# Patient Record
Sex: MALE | Race: WHITE | Employment: STUDENT | URBAN - METROPOLITAN AREA
[De-identification: names, ages, dates, MRNs, and addresses within clinical notes are randomized per-mention and may not be internally consistent; named-entity substitution may affect disease eponyms.]

---
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
## 2024-05-26 ENCOUNTER — V-VISIT (OUTPATIENT)
Dept: URGENT CARE | Age: 19
End: 2024-05-26

## 2024-05-26 VITALS
OXYGEN SATURATION: 100 % | WEIGHT: 152.56 LBS | SYSTOLIC BLOOD PRESSURE: 103 MMHG | RESPIRATION RATE: 20 BRPM | HEART RATE: 97 BPM | BODY MASS INDEX: 20.66 KG/M2 | DIASTOLIC BLOOD PRESSURE: 74 MMHG | HEIGHT: 72 IN | TEMPERATURE: 96.8 F

## 2024-05-26 DIAGNOSIS — B27.90 INFECTIOUS MONONUCLEOSIS WITHOUT COMPLICATION, INFECTIOUS MONONUCLEOSIS DUE TO UNSPECIFIED ORGANISM: Primary | ICD-10-CM

## 2024-05-26 LAB
FLUAV AG UPPER RESP QL IA.RAPID: NEGATIVE
FLUBV AG UPPER RESP QL IA.RAPID: NEGATIVE
HETEROPH AB SER QL: POSITIVE
INTERNAL PROCEDURAL CONTROLS ACCEPTABLE: YES
S PYO AG THROAT QL IA.RAPID: NEGATIVE
SARS-COV+SARS-COV-2 AG RESP QL IA.RAPID: NOT DETECTED
TEST LOT EXPIRATION DATE: NORMAL
TEST LOT EXPIRATION DATE: NORMAL
TEST LOT NUMBER: NORMAL
TEST LOT NUMBER: NORMAL

## 2024-05-26 PROCEDURE — 86308 HETEROPHILE ANTIBODY SCREEN: CPT | Performed by: NURSE PRACTITIONER

## 2024-05-26 PROCEDURE — 87428 SARSCOV & INF VIR A&B AG IA: CPT | Performed by: NURSE PRACTITIONER

## 2024-05-26 PROCEDURE — 87880 STREP A ASSAY W/OPTIC: CPT | Performed by: NURSE PRACTITIONER

## 2024-05-26 PROCEDURE — 99203 OFFICE O/P NEW LOW 30 MIN: CPT | Performed by: NURSE PRACTITIONER

## 2024-05-26 ASSESSMENT — ENCOUNTER SYMPTOMS
COUGH: 0
SORE THROAT: 1
DIAPHORESIS: 0
APPETITE CHANGE: 0
FEVER: 1
SINUS PAIN: 0
RHINORRHEA: 0
SHORTNESS OF BREATH: 0
SINUS PRESSURE: 0
VOICE CHANGE: 0
FATIGUE: 1
CHILLS: 0
UNEXPECTED WEIGHT CHANGE: 0
TROUBLE SWALLOWING: 0
ACTIVITY CHANGE: 0
WHEEZING: 0

## 2024-05-26 ASSESSMENT — PAIN SCALES - GENERAL: PAINLEVEL: 7

## 2024-05-28 ENCOUNTER — NURSE TRIAGE (OUTPATIENT)
Dept: TELEHEALTH | Age: 19
End: 2024-05-28

## 2024-05-28 ENCOUNTER — TELEPHONE (OUTPATIENT)
Dept: FAMILY MEDICINE CLINIC | Facility: CLINIC | Age: 19
End: 2024-05-28

## 2024-05-28 ENCOUNTER — HOSPITAL ENCOUNTER (OUTPATIENT)
Age: 19
Discharge: HOME | End: 2024-05-28

## 2024-05-28 VITALS
WEIGHT: 175 LBS | DIASTOLIC BLOOD PRESSURE: 77 MMHG | HEIGHT: 72 IN | RESPIRATION RATE: 16 BRPM | OXYGEN SATURATION: 98 % | TEMPERATURE: 97 F | BODY MASS INDEX: 23.7 KG/M2 | SYSTOLIC BLOOD PRESSURE: 122 MMHG | HEART RATE: 83 BPM

## 2024-05-28 DIAGNOSIS — B27.90 INFECTIOUS MONONUCLEOSIS WITHOUT COMPLICATION, INFECTIOUS MONONUCLEOSIS DUE TO UNSPECIFIED ORGANISM: Primary | ICD-10-CM

## 2024-05-28 LAB — S PYO AG THROAT QL IA.RAPID: NEGATIVE

## 2024-05-28 PROCEDURE — 99213 OFFICE O/P EST LOW 20 MIN: CPT

## 2024-05-28 PROCEDURE — 87651 STREP A DNA AMP PROBE: CPT | Performed by: NURSE PRACTITIONER

## 2024-05-28 PROCEDURE — 99214 OFFICE O/P EST MOD 30 MIN: CPT

## 2024-05-28 RX ORDER — DEXAMETHASONE 4 MG/1
12 TABLET ORAL ONCE
Status: COMPLETED | OUTPATIENT
Start: 2024-05-28 | End: 2024-05-28

## 2024-05-28 RX ORDER — ONDANSETRON 4 MG/1
4 TABLET, ORALLY DISINTEGRATING ORAL EVERY 8 HOURS PRN
Qty: 10 TABLET | Refills: 0 | Status: SHIPPED | OUTPATIENT
Start: 2024-05-28 | End: 2024-06-04

## 2024-05-28 NOTE — ED INITIAL ASSESSMENT (HPI)
Pt diagnosed with mono on Sunday ; tonsils more inflammed ; strep negative on Sunday    Tmax 100f

## 2024-05-28 NOTE — TELEPHONE ENCOUNTER
Patients mother calling for an appointment today with Dr. Lois Rodriguez (out of office). Patient was seen at a [facility] this weekend and diagnosed with Mono. Other symptoms have come up that were concerning.   Mother is taking patient to [facility]

## 2024-05-28 NOTE — DISCHARGE INSTRUCTIONS
The decadron will help with pain and swelling for the net 3-4 days.  Continue Advil.  No contact sports or heavy lifting until released by primary.

## 2024-05-28 NOTE — ED PROVIDER NOTES
Patient Seen in: [facility]      History     Chief Complaint   Patient presents with    Sore Throat     Stated Complaint: Tosil pain,    Subjective:   HPI  18-year-old male who tested positive for mono over the weekend who his girlfriend currently has strep and mono presents with mother for worsening tonsillar swelling and pain.  He has no fever.  He has some nausea without abdominal pain.  He has been taking Advil for pain.    Objective:   Past Medical History:    Allergic rhinitis    Closed nondisplaced fracture of proximal phalanx of left thumb, initial encounter    History of varicella as a child    New daily persistent headache    Currently no HA    Pectus excavatum    Striae    back              Past Surgical History:   Procedure Laterality Date    Anesth,surgery of shoulder Right 11/2022    labrum tear                Social History     Socioeconomic History    Marital status: Single   Tobacco Use    Smoking status: Never    Smokeless tobacco: Never   Vaping Use    Vaping status: Never Used   Substance and Sexual Activity    Alcohol use: No     Alcohol/week: 0.0 standard drinks of alcohol    Drug use: No    Sexual activity: Never   Other Topics Concern     Service No    Blood Transfusions No    Caffeine Concern Yes     Comment: 2 Cans of soda daily    Occupational Exposure No    Hobby Hazards No    Sleep Concern No    Stress Concern No    Weight Concern No    Special Diet No    Back Care No    Exercise Yes     Comment: 2 x weekly    Bike Helmet No    Seat Belt Yes    Self-Exams No              Review of Systems   All other systems reviewed and are negative.      Positive for stated complaint: Tosil pain,  Other systems are as noted in HPI.  Constitutional and vital signs reviewed.      All other systems reviewed and negative except as noted above.    Physical Exam     ED Triage Vitals [05/28/24 1620]   /77   Pulse 83   Resp 16   Temp 97.4 °F (36.3 °C)   Temp src Temporal   SpO2  98 %   O2 Device None (Room air)       Current Vitals:   Vital Signs  BP: 122/77  Pulse: 83  Resp: 16  Temp: 97.4 °F (36.3 °C)  Temp src: Temporal    Oxygen Therapy  SpO2: 98 %  O2 Device: None (Room air)            Physical Exam  Vitals and nursing note reviewed.   Constitutional:       General: He is not in acute distress.     Appearance: He is well-developed. He is not ill-appearing or toxic-appearing.   HENT:      Right Ear: Tympanic membrane and ear canal normal.      Left Ear: Tympanic membrane and ear canal normal.      Nose: No congestion or rhinorrhea.      Mouth/Throat:      Pharynx: Uvula midline. Posterior oropharyngeal erythema present. No pharyngeal swelling, oropharyngeal exudate or uvula swelling.      Tonsils: Tonsillar exudate present. No tonsillar abscesses. 1+ on the right. 1+ on the left.      Comments: No trismus  Cardiovascular:      Rate and Rhythm: Normal rate and regular rhythm.      Heart sounds: Normal heart sounds.   Pulmonary:      Effort: Pulmonary effort is normal.      Breath sounds: Normal breath sounds.   Lymphadenopathy:      Cervical: Cervical adenopathy present.   Skin:     General: Skin is warm and dry.   Neurological:      Mental Status: He is alert and oriented to person, place, and time.               ED Course     Labs Reviewed   RAPID STREP A - Normal                      MDM     Medical Decision Making  18-year-old male who tested positive for mono over the weekend who his girlfriend currently has strep and mono presents with mother for worsening tonsillar swelling and pain.  He has no fever.  He has some nausea without abdominal pain.  He has been taking Advil for pain.    Clinical impression: Mono    Differential diagnosis: Mono, peritonsillar abscess, strep    Strep negative.  There is no evidence of a peritonsillar abscess.  Patient with known mono.  He will be given Decadron since he is only taking Advil and is complaining of pain and swelling.  Patient and mother  instructed on Advil, close follow-up with primary care doctor, and no contact sports or exertional activity.    Problems Addressed:  Infectious mononucleosis without complication, infectious mononucleosis due to unspecified organism: acute illness or injury    Amount and/or Complexity of Data Reviewed  Independent Historian: parent     Details: Mother        Disposition and Plan     Clinical Impression:  1. Infectious mononucleosis without complication, infectious mononucleosis due to unspecified organism         Disposition:  Discharge  5/28/2024  4:39 pm    Follow-up:  No follow-up provider specified.        Medications Prescribed:  Discharge Medication List as of 5/28/2024  4:42 PM

## 2024-05-31 ENCOUNTER — TELEPHONE (OUTPATIENT)
Dept: FAMILY MEDICINE CLINIC | Facility: CLINIC | Age: 19
End: 2024-05-31

## 2024-05-31 NOTE — TELEPHONE ENCOUNTER
Detailed VM left for patient. I gave him all the below recommendations, reviewed supportive care, and Er precautions. Verbalized understanding.   
I spoke w/ Ron. He went to [facility] and dx w/ mono. He was given Decadron 12mg PO while he was there. He says it did help and he was feeling better for a day or so but now his symptoms have returned: sore throat, body aches, fever. Would a course of steroids be appropriate? Please advise, thanks.    Supportive care for mono reviewed.   
MYCHAL left for Ron to return call.   
Patients mother calling, patient was seen at  for mono and prescribed prednisone, which seemed to help. Mother is asking if they can get another refill   Please advise.   
Routine use of corticosteroids for infectious mono are not recommended.  However, recommend supportive care with analgesics such as NSAIDs ibuprofen or naproxen, patient can also try acetaminophen.  Patient should stay well-hydrated, dehydration can contribute to pain.  Patient should also try to follow a healthy diet.  If patient has any difficulty with breathing he should go to the emergency department.    If needed, okay for note for off work or off school for a week.  
10-Santy-2019 07:54

## 2024-07-11 ENCOUNTER — OFFICE VISIT (OUTPATIENT)
Dept: FAMILY MEDICINE CLINIC | Facility: CLINIC | Age: 19
End: 2024-07-11
Payer: COMMERCIAL

## 2024-07-11 VITALS
HEIGHT: 72.48 IN | DIASTOLIC BLOOD PRESSURE: 60 MMHG | HEART RATE: 70 BPM | WEIGHT: 171 LBS | SYSTOLIC BLOOD PRESSURE: 102 MMHG | RESPIRATION RATE: 20 BRPM | OXYGEN SATURATION: 97 % | BODY MASS INDEX: 22.91 KG/M2 | TEMPERATURE: 98 F

## 2024-07-11 DIAGNOSIS — B27.90 INFECTIOUS MONONUCLEOSIS WITHOUT COMPLICATION, INFECTIOUS MONONUCLEOSIS DUE TO UNSPECIFIED ORGANISM: Primary | ICD-10-CM

## 2024-07-11 PROCEDURE — 99213 OFFICE O/P EST LOW 20 MIN: CPT | Performed by: FAMILY MEDICINE

## 2024-07-11 NOTE — PROGRESS NOTES
Ron Watts is a 18 year old male.     Chief Complaint   Patient presents with    Other     Checking to see if spleen is enlarged mono positive end of may          HPI:     Patient presents with his mother who is pleasant and supportive.      Patient diagnosed with infectious mono 5/26/2024.  Patient inquiring as to whether or not he may return to physical activities and sports.  Feeling much better overall.            Wt Readings from Last 6 Encounters:   07/11/24 171 lb (77.6 kg) (72%, Z= 0.57)*   05/28/24 175 lb (79.4 kg) (76%, Z= 0.72)*   08/11/23 176 lb (79.8 kg) (80%, Z= 0.84)*   05/23/23 160 lb (72.6 kg) (63%, Z= 0.34)*   05/18/23 160 lb (72.6 kg) (63%, Z= 0.34)*   07/07/22 158 lb (71.7 kg) (66%, Z= 0.41)*     * Growth percentiles are based on CDC (Boys, 2-20 Years) data.      Body mass index is 22.89 kg/m².        No current outpatient medications on file.      Past Medical History:    Allergic rhinitis    Closed nondisplaced fracture of proximal phalanx of left thumb, initial encounter    History of varicella as a child    New daily persistent headache    Currently no HA    Pectus excavatum    Striae    back      Past Surgical History:   Procedure Laterality Date    Anesth,surgery of shoulder Right 11/2022    labrum tear      Social History:    Social History     Socioeconomic History    Marital status: Single   Tobacco Use    Smoking status: Never    Smokeless tobacco: Never   Vaping Use    Vaping status: Never Used   Substance and Sexual Activity    Alcohol use: No     Alcohol/week: 0.0 standard drinks of alcohol    Drug use: No    Sexual activity: Never   Other Topics Concern     Service No    Blood Transfusions No    Caffeine Concern Yes     Comment: 2 Cans of soda daily    Occupational Exposure No    Hobby Hazards No    Sleep Concern No    Stress Concern No    Weight Concern No    Special Diet No    Back Care No    Exercise Yes     Comment: 2 x weekly    Bike Helmet No    Seat Belt Yes     Self-Exams No         Family History   Problem Relation Age of Onset    Other (Other[other]) Maternal Grandmother         Heart problem    Hypertension Maternal Grandfather      REVIEW OF SYSTEMS:   GENERAL HEALTH: Overall feels well.    HEENT: Negative  SKIN: denies any unusual skin lesions or rashes   RESPIRATORY: Denies: DAMION/HUTCHINSON/Cough  CARDIOVASCULAR: Denies CP/palpitations  GI: Denies abdominal pain/nausea/vomiting/blood in stool/black stool/bloating/constipation/diarrhea  : denies urinary symptoms  NEURO: denies headaches/dizziness  PSYCH: denies depression and anxiety    Immunization History   Administered Date(s) Administered    Covid-19 Vaccine Pfizer 30 mcg/0.3 ml 04/06/2021, 04/29/2021    DTAP 11/08/2004, 01/06/2005, 03/15/2005, 03/15/2006, 04/05/2010    DTAP-IPV 04/05/2010    HEP A 08/30/2018    HEP A,Ped/Adol,(2 Dose) 08/30/2018, 07/29/2019    HEP B 09/04/2004, 11/08/2004, 03/15/2005    HIB 11/08/2004, 01/06/2005, 12/08/2005    Hpv Virus Vaccine 9 [facility] 06/06/2017, 12/26/2017    IPV 11/08/2004, 03/15/2005, 03/16/2006, 04/05/2010    MMR 09/19/2005, 04/05/2010    Meningococcal Vaccine 07/07/2022    Meningococcal-Menactra 05/23/2016    Meningococcal-Menveo 2month-55yr 07/07/2022    Pneumococcal (Prevnar 7) 11/08/2004, 01/06/2005, 03/15/2005, 12/08/2005    TDAP 05/23/2016    Tb Intradermal Test 07/25/2022, 08/01/2022    Varicella 09/19/2005       EXAM:   /60   Pulse 70   Temp 97.9 °F (36.6 °C) (Temporal)   Resp 20   Ht 6' 0.48\" (1.841 m)   Wt 171 lb (77.6 kg)   SpO2 97%   BMI 22.89 kg/m²   GENERAL: NAD, pleasant well-appearing  male  SKIN: no visible rashes  HEAD: NCAT  GI: NT/ND, no pulsations, no r/r/g, no masses appreciated.  No splenomegaly appreciated.  EXTREMITIES: no cyanosis or clubbing  NEURO: Alert and Oriented x3.  No gross motor or gross sensory abnormalities.  PSYCH: Affect normal.  Normal thought content.        DATA:    Component  Ref Range & Units 5/26/24  3:02 PM    Monospot  Negative Positive Abnormal      Walk-in clinic/immediate care note dated 5/26/2024 at outside facility and care everywhere read and reviewed.  5/28/2024 [place] immediate care provider note read and reviewed.    ASSESSMENT AND PLAN:       Encounter Diagnosis   Name Primary?    Infectious mononucleosis without complication, infectious mononucleosis due to unspecified organism Yes       1. Infectious mononucleosis without complication, infectious mononucleosis due to unspecified organism  Patient is past the 4-week zainab for diagnosis of infectious mono and abdominal exam in office today is benign.  Patient verbally instructed that he may proceed with normal physical activities.  Discussed with patient he is low risk for rupture of spleen.        Return in about 1 month (around 8/11/2024) for Annual wellness visit.

## (undated) NOTE — Clinical Note
[employer] of Child Health Examination       Student's Name  Talita Hassan Birth Stas Title                           Date    (If adding dates to the above immunization history section, put your initials by date(s) and sign here.)   ALTERNATIVE PROOF OF IMMUNITY   1 (List all prescribed or taken on a regular basis.)  No current outpatient prescriptions on file. Diagnosis of asthma?   Child wakes during the night coughing  Yes       No   Yes       No    Loss of function of one of paired organs? (eye/ear/kidney/testicl DIABETES SCREENING  BMI>85% age/sex  No And any two of the following:  Family History                    Ethnic Minority                             Signs of Insulin Resistance (hypertension, dyslipidemia, polycystic ovarian syndrome, acanthosis nigricans) Controller medication (e.g. inhaled corticosteroid):   No Other   NEEDS/MODIFICATIONS required in the school setting  None DIETARY Needs/Restrictions     None   SPECIAL INSTRUCTIONS/DEVICES e.g. safety glasses, glass eye, chest protector for arrhyt

## (undated) NOTE — LETTER
Date: 8/21/2017    Patient Name: Lalo Agustin          To Whom it may concern: This letter has been written at the patient's request. The above patient was seen at the [facility] for treatment of a medical condition.     This patient jesus albertou

## (undated) NOTE — Clinical Note
Please call patient's parent and encourage them to make the appointment for the echocardiogram that was ordered.

## (undated) NOTE — Clinical Note
Name:  Marisabel Kenyon Year:  7th Grade Class: Student ID No.:   Address:  [address] [place] [address] Phone:  [phone] (home)  :  14year old   Name Relationship [address] Work Phone Home Phone Mobile Phone   1.  ADRIANA PORTILLO 13. Does anyone in your family have a heart problem, pacemaker, or implanted defibrillator? No   16. Has anyone in your family had unexplained fainting, seizures, or near drowning?  No   BONE AND JOINT QUESTIONS    17. Have you ever had an injury to a bone, 38. Have you ever had numbness, tingling, or weakness in your arms or legs after being hit or falling? No   39. Have you ever been unable to move your arms / legs after being hit /fall? No   40. Have you ever become ill while exercising in the heat?  No   41 excavatum,      arachnodactyly, arm span > height, hyperlaxity, myopia, MVP, aortic insufficiency) Yes    Eyes/Ears/Nose/Throat:    · Pupils equal  · Hearing Yes    Lymph nodes Yes    Heart*  · Murmurs (auscultation standing, supine, +/- Valsalva)  · Locat defined in the [facility] Performance-Enhancing Substance Testing Program Protocol.  We have reviewed the policy and understand that I/our student may be asked to submit to testing for the presence of performance-enhancing substances in my/his/her body either dur

## (undated) NOTE — ED AVS SNAPSHOT
Parent/Legal Guardian Access to the Online AgLocal Record of a Patient 15to 16Years Old  Return completed form by Secure email to [facility] HIM/Medical Records Department: [email or web address]     Requirements and Procedures   Under [facility] MyChart ID and password with another person, that person may be able to view my or my child’s health information, and health information about someone who has authorized me as a MyChart proxy.    ·  I agree that it is my responsibility to select a confident Sign-Up Form and I agree to its terms.        Authorization Form     Please enter Patient’s information below:   Name (last, first, middle initial) __________________________________________   Gender  Male  Female    Last 4 Digits of Social Security Number Parent/Legal Guardian Signature                                  For Patient (1417 years of age)  I agree to allow my parent/legal guardian, named above, online access to my medical information currently available and that may become available as a result

## (undated) NOTE — LETTER
Date: 2/3/2022    Patient Name: Pawel Summers          To Whom it may concern: This letter has been written at the patient's request. The above patient was seen at the [facility] for treatment of a medical condition.     This patient should be excused from gym or sports until cleared by the cardiologist.        Sincerely,        Rod Bonner, DO

## (undated) NOTE — LETTER
Date: 9/5/2017    Patient Name: Katie Mosqueda          To Whom it may concern: This letter has been written at the patient's request. The above patient was seen at the [facility] for treatment of a medical condition.     The patient may re

## (undated) NOTE — LETTER
Date & Time: 10/15/2021, 1:25 PM  Patient: Mary Merritt  Encounter Provider(s):    Tu Bergeron PA-C       To Whom It May Concern:    Al Gallardo was seen and treated in our department on 10/15/2021.  He should not participate in gym/sports until 1

## (undated) NOTE — LETTER
Date: 12/13/2017    Patient Name: Kamala Cohen          To Whom it may concern: The above patient was seen at the [facility] for treatment of a medical condition.     This patient should be excused from attending school from 12/12/2017 th

## (undated) NOTE — LETTER
VACCINE ADMINISTRATION RECORD  PARENT / GUARDIAN APPROVAL  Date: 2023  Vaccine administered to: Angus Young     : 2005    MRN: [id number]    A copy of the appropriate Centers for Disease Control and Prevention Vaccine Information statement has been provided. I have read or have had explained the information about the diseases and the vaccines listed below. There was an opportunity to ask questions and any questions were answered satisfactorily. I believe that I understand the benefits and risks of the vaccine cited and ask that the vaccine(s) listed below be given to me or to the person named above (for whom I am authorized to make this request). VACCINES ADMINISTERED:  Chicken Pox  Vaccine    I have read and hereby agree to be bound by the terms of this agreement as stated above. My signature is valid until revoked by me in writing. This document is signed by Angus Young, relationship: Self on 2023.:                                                                                                                                         Parent / Guardian Signature                                                Date    Brandy Bird served as a witness to authentication that the identity of the person signing electronically is in fact the person represented as signing. This document was generated by Brandy Bird on 2023.

## (undated) NOTE — LETTER
Date & Time: 5/18/2023, 5:06 PM  Patient: Osbaldo Rogers  Encounter Provider(s):    MARGARITO Bran       To Whom It May Concern:    Jen Gómez was seen and treated in our department on 5/18/2023. He may not participate in gym or sports until cleared by ortho.     If you have any questions or concerns, please do not hesitate to call.        _____________________________  Physician/APC Signature

## (undated) NOTE — MR AVS SNAPSHOT
[facility]  [phone]               Thank you for choosing us for your health care visit with Ambreen Ramirez MD.  We are glad to serve you and happy to provide you with this mcmanus Call [phone] for help. MyChart is NOT to be used for urgent needs. For medical emergencies, dial 911.             Educational Information     Healthy Active Living  An initiative of the American Academy of Pediatrics    Fact Sheet: Healthy Active Help your children form healthy habits. Healthy active children are more likely to be healthy active adults!              Visit [facility] online at  [email or web address]